# Patient Record
Sex: MALE
[De-identification: names, ages, dates, MRNs, and addresses within clinical notes are randomized per-mention and may not be internally consistent; named-entity substitution may affect disease eponyms.]

---

## 2023-04-07 ENCOUNTER — APPOINTMENT (OUTPATIENT)
Dept: ORTHOPEDIC SURGERY | Facility: CLINIC | Age: 78
End: 2023-04-07
Payer: MEDICARE

## 2023-04-07 VITALS — WEIGHT: 150 LBS | HEIGHT: 66 IN | BODY MASS INDEX: 24.11 KG/M2

## 2023-04-07 VITALS — BODY MASS INDEX: 24.11 KG/M2 | WEIGHT: 150 LBS | HEIGHT: 66 IN

## 2023-04-07 DIAGNOSIS — M72.0 PALMAR FASCIAL FIBROMATOSIS [DUPUYTREN]: ICD-10-CM

## 2023-04-07 PROBLEM — Z00.00 ENCOUNTER FOR PREVENTIVE HEALTH EXAMINATION: Status: ACTIVE | Noted: 2023-04-07

## 2023-04-07 PROCEDURE — 99202 OFFICE O/P NEW SF 15 MIN: CPT

## 2023-04-07 NOTE — PHYSICAL EXAM
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  Good flexion of the fingers sensation normal cap refill.

## 2023-04-07 NOTE — ASSESSMENT
[FreeTextEntry1] : Patient has Dupuytren's contracture natural history of this condition was discussed with the patient surgical intervention was discussed.  Needle aponeurotomy was discussed and Xiaflex was discussed years ago he had Xiaflex treatment on the opposite hand it was painful but it did work well to correct the condition.  Should proceed with Xiaflex on this hand.  And benefits including the treatment plan of the injection and the manipulation no anesthesia for the injection was discussed as well.  He will see us back the time of the procedure.

## 2023-04-07 NOTE — HISTORY OF PRESENT ILLNESS
[de-identified] : 77-year-old male comes the office today evaluation guarding his left hand patient has a known history of Dupuytren's contracture in his hand he previously had treatment at an outside now he has Dupuytren's present on the opposite hand.  He has a bent finger that interferes with his function.

## 2023-04-18 RX ORDER — COLLAGENASE CLOSTRIDIUM HISTOLYTICUM 0.9 MG
0.9 KIT INJECTION
Qty: 1 | Refills: 0 | Status: ACTIVE | COMMUNITY
Start: 2023-04-18 | End: 1900-01-01

## 2024-06-26 ENCOUNTER — APPOINTMENT (OUTPATIENT)
Dept: ORTHOPEDIC SURGERY | Facility: CLINIC | Age: 79
End: 2024-06-26